# Patient Record
Sex: FEMALE | Race: WHITE
[De-identification: names, ages, dates, MRNs, and addresses within clinical notes are randomized per-mention and may not be internally consistent; named-entity substitution may affect disease eponyms.]

---

## 2019-10-03 ENCOUNTER — HOSPITAL ENCOUNTER (EMERGENCY)
Dept: HOSPITAL 82 - ED | Age: 24
Discharge: HOME | End: 2019-10-03
Payer: COMMERCIAL

## 2019-10-03 VITALS — DIASTOLIC BLOOD PRESSURE: 89 MMHG | SYSTOLIC BLOOD PRESSURE: 142 MMHG

## 2019-10-03 VITALS — BODY MASS INDEX: 38.09 KG/M2 | HEIGHT: 60 IN | WEIGHT: 194.01 LBS

## 2019-10-03 DIAGNOSIS — T63.481A: Primary | ICD-10-CM

## 2019-12-15 ENCOUNTER — HOSPITAL ENCOUNTER (EMERGENCY)
Dept: HOSPITAL 82 - ED | Age: 24
Discharge: HOME | End: 2019-12-15
Payer: COMMERCIAL

## 2019-12-15 VITALS — HEIGHT: 60 IN | BODY MASS INDEX: 36.36 KG/M2 | WEIGHT: 185.19 LBS

## 2019-12-15 VITALS — DIASTOLIC BLOOD PRESSURE: 77 MMHG | SYSTOLIC BLOOD PRESSURE: 134 MMHG

## 2019-12-15 DIAGNOSIS — K04.7: Primary | ICD-10-CM

## 2020-03-06 ENCOUNTER — HOSPITAL ENCOUNTER (EMERGENCY)
Age: 25
Discharge: HOME | DRG: 605 | End: 2020-03-06
Payer: COMMERCIAL

## 2020-03-06 DIAGNOSIS — S60.221A: ICD-10-CM

## 2020-03-06 DIAGNOSIS — S60.412A: ICD-10-CM

## 2020-03-06 DIAGNOSIS — S60.222A: ICD-10-CM

## 2020-03-06 DIAGNOSIS — T14.8XXA: ICD-10-CM

## 2020-03-06 DIAGNOSIS — S80.01XA: ICD-10-CM

## 2020-03-06 DIAGNOSIS — S70.01XA: ICD-10-CM

## 2020-03-06 DIAGNOSIS — S00.83XA: Primary | ICD-10-CM
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
## 2020-03-06 DIAGNOSIS — S40.011A: ICD-10-CM

## 2020-03-06 LAB
ALBUMIN SERPL-MCNC: 4 G/DL (ref 3.2–5)
ALP SERPL-CCNC: 72 U/L (ref 38–126)
ANION GAP SERPL CALCULATED.3IONS-SCNC: 12 MMOL/L
APTT PPP: 25.7 SECONDS (ref 20–32.5)
AST SERPL-CCNC: 26 U/L (ref 14–36)
BASOPHILS NFR BLD AUTO: 0 % (ref 0–3)
BUN SERPL-MCNC: 14 MG/DL (ref 7–17)
BUN/CREAT SERPL: 23
CHLORIDE SERPL-SCNC: 107 MMOL/L (ref 95–108)
CO2 SERPL-SCNC: 22 MMOL/L (ref 22–30)
CREAT SERPL-MCNC: 0.6 MG/DL (ref 0.5–1)
EOSINOPHIL NFR BLD AUTO: 6 % (ref 0–8)
ERYTHROCYTE [DISTWIDTH] IN BLOOD BY AUTOMATED COUNT: 13 % (ref 11.5–15.5)
HCT VFR BLD AUTO: 34.8 % (ref 37–47)
HGB BLD-MCNC: 11.6 G/DL (ref 12–16)
IMM GRANULOCYTES NFR BLD: 0.5 % (ref 0–5)
INR PPP: 1 RATIO (ref 0.7–1.3)
LIPASE SERPL-CCNC: 55 U/L (ref 23–300)
LYMPHOCYTES NFR BLD: 19 % (ref 15–41)
MCH RBC QN AUTO: 28.8 PG  CALC (ref 26–32)
MCHC RBC AUTO-ENTMCNC: 33.3 G/L CALC (ref 32–36)
MCV RBC AUTO: 86.4 FL  CALC (ref 80–100)
MONOCYTES NFR BLD AUTO: 11 % (ref 2–13)
NEUTROPHILS # BLD AUTO: 4.12 THOU/UL (ref 2–7.15)
NEUTROPHILS NFR BLD AUTO: 63 % (ref 42–76)
PLATELET # BLD AUTO: 113 THOU/UL (ref 130–400)
POTASSIUM SERPL-SCNC: 4.2 MMOL/L (ref 3.5–5.1)
PROT SERPL-MCNC: 7.1 G/DL (ref 6.3–8.2)
PROTHROMBIN TIME: 10 SECONDS (ref 9–12.5)
RBC # BLD AUTO: 4.03 MILL/UL (ref 4.2–5.6)
SODIUM SERPL-SCNC: 137 MMOL/L (ref 137–146)

## 2020-03-22 ENCOUNTER — HOSPITAL ENCOUNTER (EMERGENCY)
Dept: HOSPITAL 82 - ED | Age: 25
Discharge: HOME | End: 2020-03-22
Payer: COMMERCIAL

## 2020-03-22 VITALS — DIASTOLIC BLOOD PRESSURE: 77 MMHG | SYSTOLIC BLOOD PRESSURE: 131 MMHG

## 2020-03-22 DIAGNOSIS — J02.9: Primary | ICD-10-CM

## 2021-02-10 ENCOUNTER — HOSPITAL ENCOUNTER (OUTPATIENT)
Dept: HOSPITAL 82 - ED | Age: 26
Setting detail: OBSERVATION
LOS: 1 days | Discharge: HOME | End: 2021-02-11
Attending: INTERNAL MEDICINE | Admitting: INTERNAL MEDICINE
Payer: COMMERCIAL

## 2021-02-10 VITALS — DIASTOLIC BLOOD PRESSURE: 67 MMHG | SYSTOLIC BLOOD PRESSURE: 121 MMHG

## 2021-02-10 VITALS — DIASTOLIC BLOOD PRESSURE: 67 MMHG | SYSTOLIC BLOOD PRESSURE: 118 MMHG

## 2021-02-10 VITALS — DIASTOLIC BLOOD PRESSURE: 64 MMHG | SYSTOLIC BLOOD PRESSURE: 118 MMHG

## 2021-02-10 VITALS — HEIGHT: 64 IN | BODY MASS INDEX: 36.13 KG/M2 | WEIGHT: 211.64 LBS

## 2021-02-10 DIAGNOSIS — E87.2: ICD-10-CM

## 2021-02-10 DIAGNOSIS — N83.201: ICD-10-CM

## 2021-02-10 DIAGNOSIS — Z20.822: ICD-10-CM

## 2021-02-10 DIAGNOSIS — K56.600: Primary | ICD-10-CM

## 2021-02-10 DIAGNOSIS — D69.3: ICD-10-CM

## 2021-02-10 LAB
ALBUMIN SERPL-MCNC: 4.3 G/DL (ref 3.2–5)
ALP SERPL-CCNC: 76 U/L (ref 38–126)
AMYLASE SERPL-CCNC: 57 U/L (ref 30–110)
ANION GAP SERPL CALCULATED.3IONS-SCNC: 15 MMOL/L
AST SERPL-CCNC: 27 U/L (ref 14–36)
BASOPHILS NFR BLD AUTO: 0 % (ref 0–3)
BILIRUB UR QL STRIP.AUTO: NEGATIVE
BUN SERPL-MCNC: 18 MG/DL (ref 7–17)
BUN/CREAT SERPL: 29
CHLORIDE SERPL-SCNC: 102 MMOL/L (ref 95–108)
CO2 SERPL-SCNC: 25 MMOL/L (ref 22–30)
COLOR UR AUTO: YELLOW
CREAT SERPL-MCNC: 0.6 MG/DL (ref 0.5–1)
EOSINOPHIL NFR BLD AUTO: 1 % (ref 0–8)
ERYTHROCYTE [DISTWIDTH] IN BLOOD BY AUTOMATED COUNT: 12.7 % (ref 11.5–15.5)
GLUCOSE UR STRIP.AUTO-MCNC: NEGATIVE MG/DL
HCT VFR BLD AUTO: 37.3 % (ref 37–47)
HGB BLD-MCNC: 12.2 G/DL (ref 12–16)
HGB UR QL STRIP.AUTO: NEGATIVE
IMM GRANULOCYTES NFR BLD: 0.3 % (ref 0–5)
KETONES UR STRIP.AUTO-MCNC: NEGATIVE MG/DL
LEUKOCYTE ESTERASE UR QL STRIP.AUTO: NEGATIVE
LIPASE SERPL-CCNC: 58 U/L (ref 23–300)
LYMPHOCYTES NFR BLD: 5 % (ref 15–41)
MCH RBC QN AUTO: 27.9 PG  CALC (ref 26–32)
MCHC RBC AUTO-ENTMCNC: 32.7 G/DL CAL (ref 32–36)
MCV RBC AUTO: 85.2 FL  CALC (ref 80–100)
MONOCYTES NFR BLD AUTO: 9 % (ref 2–13)
NEUTROPHILS # BLD AUTO: 5.89 THOU/UL (ref 2–7.15)
NEUTROPHILS NFR BLD AUTO: 84 % (ref 42–76)
NITRITE UR QL STRIP.AUTO: NEGATIVE
PH UR STRIP.AUTO: 7.5 [PH] (ref 4.5–8)
PLATELET # BLD AUTO: 103 THOU/UL (ref 130–400)
POTASSIUM SERPL-SCNC: 4.2 MMOL/L (ref 3.5–5.1)
PROT SERPL-MCNC: 7.4 G/DL (ref 6.3–8.2)
PROT UR QL STRIP.AUTO: NEGATIVE MG/DL
RBC # BLD AUTO: 4.38 MILL/UL (ref 4.2–5.6)
SODIUM SERPL-SCNC: 137 MMOL/L (ref 137–146)
SP GR UR STRIP.AUTO: 1.01
UROBILINOGEN UR QL STRIP.AUTO: 0.2 E.U./DL

## 2021-02-10 PROCEDURE — G0378 HOSPITAL OBSERVATION PER HR: HCPCS

## 2021-02-10 NOTE — NUR
PT WAS FOUND RESTING IN BED;PT REPORTS PAIN OF 9/10;PT WAS GIVEN TORADOL 15MG
IV PUSH;IV SITE IS PATENT AND FREE OF COMPLICATIONS AT THIS TIME;NO REPORTS
OF NAUSEA OR VOMITING; SAFETY PRECAUTIONS IN PLACE;BED IN LOWEST POSITION;CALL
LIGHT WITHIN REACH;WILL REASSESS PAIN AND CONTINUE TO MONITOR.

## 2021-02-10 NOTE — NUR
PHYSICAL ASSESMENT COMPLETE. PT CURRENTLY DENIES PAIN OR DISCOMFORT.
SCHEDULED MEDICATIONS AND PRN MEDICATION ADMINISTERED, SEE E-MAR.  PT DENIES
ANY NEEDS AT THIS TIME.  PLAN OF CARE REVIEWED, PT DENIES QUESTIONS,
VERBALIZES UNDERSTANDING. ITEMS WITHIN REACH, BED LOCKED IN LOW POSITION W/
BEDRAILS UP X2. CALL BELL WITHIN REACH, AGREES TO CALL PRN.

## 2021-02-10 NOTE — NUR
PT ARRIVED VIA STRETCHER FROM ED ACCOMPANIED BY PARIS BERRIOS RN;PT
AMBULATED TO BED WITH MINIMAL ASSISTANCE;PT VITALS WERE TAKEN AND WITHIN
NORMAL LIMITS;PT HAS NO TELE OR O2 IN PLACE;ASSESSMENT COMPLETED;#20G IV IN
LAC IS RUNNING NS @150ML/HR AND IS FREE OF COMPLICATIONS AT THIS TIME;PT
ALLERGY ARMBAND WAS PLACED;PT BELONGING INVENTORY WAS COMPLETED;PT WAS
ORIENTED TO ROOM;SAFETY PRECAUTIONS IN PLACE;CALL LIGHT LEFT WITHIN REACH;PT
INSTRUCTED TO CALL WITH ANY QUESTIONS OR CONCERNS;WILL CONTINUE TO MONITOR.

## 2021-02-11 VITALS — DIASTOLIC BLOOD PRESSURE: 60 MMHG | SYSTOLIC BLOOD PRESSURE: 110 MMHG

## 2021-02-11 VITALS — DIASTOLIC BLOOD PRESSURE: 64 MMHG | SYSTOLIC BLOOD PRESSURE: 101 MMHG

## 2021-02-11 VITALS — SYSTOLIC BLOOD PRESSURE: 130 MMHG | DIASTOLIC BLOOD PRESSURE: 76 MMHG

## 2021-02-11 LAB
ANION GAP SERPL CALCULATED.3IONS-SCNC: 9 MMOL/L
BUN SERPL-MCNC: 11 MG/DL (ref 7–17)
BUN/CREAT SERPL: 18
CHLORIDE SERPL-SCNC: 108 MMOL/L (ref 95–108)
CO2 SERPL-SCNC: 21 MMOL/L (ref 22–30)
CREAT SERPL-MCNC: 0.6 MG/DL (ref 0.5–1)
ERYTHROCYTE [DISTWIDTH] IN BLOOD BY AUTOMATED COUNT: 12.7 % (ref 11.5–15.5)
HCT VFR BLD AUTO: 33.3 % (ref 37–47)
HGB BLD-MCNC: 10.8 G/DL (ref 12–16)
MAGNESIUM SERPL-MCNC: 1.8 MG/DL (ref 1.6–2.3)
MCH RBC QN AUTO: 27.8 PG  CALC (ref 26–32)
MCHC RBC AUTO-ENTMCNC: 32.4 G/DL CAL (ref 32–36)
MCV RBC AUTO: 85.6 FL  CALC (ref 80–100)
PLATELET # BLD AUTO: 68 THOU/UL (ref 130–400)
POTASSIUM SERPL-SCNC: 3.6 MMOL/L (ref 3.5–5.1)
RBC # BLD AUTO: 3.89 MILL/UL (ref 4.2–5.6)
SODIUM SERPL-SCNC: 135 MMOL/L (ref 137–146)

## 2021-02-11 NOTE — NUR
PT RESTING IN BED, NO SIGNS OF DISTRESS NOTED, RESP EVEN AND UNLABORED. PT
VOICES NO NEEDS OR COMPLAINTS AT THIS TIME. CALL LIGHT IN REACH, CONTINUE TO
MONITOR.

## 2021-02-11 NOTE — NUR
PATIENT CATSCAN SHOWED NO BOWEL OBSTRUCTION AND POSSIBLE OVARIAN CYSTS.
MERARY LUNA TO DC BY MD AND WILL FOLLOW UP WITH OBGYN

## 2021-05-24 ENCOUNTER — HOSPITAL ENCOUNTER (EMERGENCY)
Dept: HOSPITAL 82 - ED | Age: 26
Discharge: HOME | End: 2021-05-24
Payer: COMMERCIAL

## 2021-05-24 VITALS — BODY MASS INDEX: 37.26 KG/M2 | WEIGHT: 218.26 LBS | HEIGHT: 64 IN

## 2021-05-24 VITALS — SYSTOLIC BLOOD PRESSURE: 134 MMHG | DIASTOLIC BLOOD PRESSURE: 81 MMHG

## 2021-05-24 DIAGNOSIS — D69.3: ICD-10-CM

## 2021-05-24 DIAGNOSIS — Z20.822: ICD-10-CM

## 2021-05-24 DIAGNOSIS — J45.909: ICD-10-CM

## 2021-05-24 DIAGNOSIS — B34.9: Primary | ICD-10-CM
